# Patient Record
Sex: FEMALE | Race: WHITE | NOT HISPANIC OR LATINO | Employment: FULL TIME | URBAN - METROPOLITAN AREA
[De-identification: names, ages, dates, MRNs, and addresses within clinical notes are randomized per-mention and may not be internally consistent; named-entity substitution may affect disease eponyms.]

---

## 2022-06-19 PROBLEM — E87.70 FLUID OVERLOAD: Status: ACTIVE | Noted: 2022-06-19

## 2022-06-19 PROBLEM — I21.4 NSTEMI (NON-ST ELEVATED MYOCARDIAL INFARCTION): Status: ACTIVE | Noted: 2022-06-19

## 2022-06-19 PROBLEM — R06.03 ACUTE RESPIRATORY DISTRESS: Status: ACTIVE | Noted: 2022-06-19

## 2022-06-23 PROBLEM — E66.01 MORBID OBESITY WITH BMI OF 45.0-49.9, ADULT: Status: ACTIVE | Noted: 2022-06-23

## 2023-03-05 PROBLEM — C50.212 MALIGNANT NEOPLASM OF UPPER-INNER QUADRANT OF LEFT BREAST IN FEMALE, ESTROGEN RECEPTOR NEGATIVE: Status: ACTIVE | Noted: 2023-03-05

## 2023-03-05 PROBLEM — Z17.1 MALIGNANT NEOPLASM OF UPPER-INNER QUADRANT OF LEFT BREAST IN FEMALE, ESTROGEN RECEPTOR NEGATIVE: Status: ACTIVE | Noted: 2023-03-05

## 2023-03-05 PROBLEM — Z80.3 FAMILY HISTORY OF MALIGNANT NEOPLASM OF BREAST: Status: ACTIVE | Noted: 2023-03-05

## 2023-03-05 NOTE — PROGRESS NOTES
[facility]  MD Katie Carrero, NP-C    Chief Complaint:   Jocelyne Chamberlain is a 62 y.o. female presenting today for her 6 month follow up due to her breast cancer diagnosis.  She is due for her Mammogram.  She reports no interval changes on her self-breast examination.     History of Present Illness:   Mrs. Chamberlain was diagnosed with left breast cancer in May 2019 at the age of 58. She completed iban-adjuvant chemotherapy. SLN biopsy prior to chemotherapy showed 5 negative SLNs. She elected lumpectomy that revealed a residual 1.6 cm Grade II IDC with negative margins. [id number]. ER/[place] Negative and HER 2 = +3. [employer] Breast Cancer Guidelines-Based Panel Negative (2019). She completed adjuvant radiation therapy with Dr. Dixon. She also completed targeted therapy with Herceptin. MDs:::Bob Parish MD; Yaya Morel MD.    Past Medical History:   Diagnosis Date    Depression     Hypertension     Malignant neoplasm of upper-inner quadrant of left breast in female, estrogen receptor negative 3/5/2023      Past Surgical History:   Procedure Laterality Date    HYSTERECTOMY      LEFT HEART CATHETERIZATION Left 6/20/2022    Procedure: Left heart cath;  Surgeon: Suman Issa MD;  Location: [facility];  Service: Cardiovascular;  Laterality: Left;    rt knee surgery          Current Outpatient Medications:     ADVAIR DISKUS 250-50 mcg/dose diskus inhaler, Inhale 1 puff into the lungs 2 (two) times daily., Disp: , Rfl:     albuterol (PROVENTIL/VENTOLIN HFA) 90 mcg/actuation inhaler, Inhale 2 puffs into the lungs every 6 (six) hours as needed for Wheezing., Disp: , Rfl:     carvedilol (COREG) 25 MG tablet, TAKE ONE TABLET BY MOUTH TWICE DAILY (Patient taking differently: Take 25 mg by mouth 2 (two) times daily.), Disp: 60 tablet, Rfl: 4    ergocalciferol (ERGOCALCIFEROL) 50,000 unit Cap, TAKE ONE CAPSULE BY MOUTH ONCE A WEEK (Patient taking differently:  Take 50,000 Units by mouth every Saturday.), Disp: 4 capsule, Rfl: 4    isosorbide dinitrate (ISORDIL) 20 MG tablet, Take 1 tablet (20 mg total) by mouth 3 (three) times daily., Disp: 90 tablet, Rfl: 0    metOLazone (ZAROXOLYN) 2.5 MG tablet, Take by mouth., Disp: , Rfl:     rOPINIRole (REQUIP) 3 MG tablet, Take 3 mg by mouth nightly., Disp: , Rfl:     sertraline (ZOLOFT) 100 MG tablet, Take 100 mg by mouth every evening. TAKE WITH 50MG =150MG NIGHTLY, Disp: , Rfl:     sertraline (ZOLOFT) 50 MG tablet, Take 50 mg by mouth every evening. TAKE WITH 100MG= 150MG NIGHTLY, Disp: , Rfl:     tolterodine (DETROL LA) 4 MG 24 hr capsule, Take 4 mg by mouth once daily. ** NEW THERAPY NOT STARTED YET**, Disp: , Rfl:     torsemide (DEMADEX) 20 MG Tab, Take 1 tablet (20 mg total) by mouth once daily. (Patient not taking: Reported on 3/15/2023), Disp: 30 tablet, Rfl: 1   Review of patient's allergies indicates:   Allergen Reactions    Lactose Diarrhea      Social History     Tobacco Use    Smoking status: Never    Smokeless tobacco: Never   Substance Use Topics    Alcohol use: No      Family History   Problem Relation Age of Onset    Diabetes Father     Hypertension Father         Review of Systems   Genitourinary:  Negative for hot flashes.   Integumentary:  Negative for color change, rash, mole/lesion, breast mass, breast discharge and breast tenderness.   Breast: Negative for mass and tenderness   Physical Exam   Constitutional: She is cooperative.   HENT:   Head: Normocephalic.   Pulmonary/Chest: She exhibits no mass. Right breast exhibits no inverted nipple, no mass, no nipple discharge, no skin change and no tenderness. Left breast exhibits no inverted nipple, no mass, no nipple discharge, no skin change and no tenderness.   Abdominal: Normal appearance.   Musculoskeletal: Lymphadenopathy:      Upper Body:      Right upper body: No supraclavicular or axillary adenopathy.      Left upper body: No supraclavicular or axillary  adenopathy.     Neurological: She is alert.   Skin: No rash noted.        MAMMOGRAM REPORT: She has some diffuse fibronodular tissue bilaterally; there are no spiculated lesions, distortions or suspicious calcifications noted; NEM      Assessment/Plan  1. Malignant neoplasm of upper-inner quadrant of left breast in female, estrogen receptor negative  Assessment & Plan:   Patient is doing well and is being followed according to NCCN Guidelines. She understands that her imaging and exams have remained stable and is comfortable being followed in a conservative fashion. She understands the importance of monthly self-breast examination and knows to report any and all changes as they occur.        2. Family history of malignant neoplasm of breast  Assessment & Plan:  We discussed her family history and how it could impact her own future risks.  We discussed family vs. genetic history and the importance of each.  Genetic Counseling/Testing was offered, and all questions answered.             Medical Decision Making:  It is my impression that the patient suffers from all the listed conditions. Each of these conditions did affect my Plan of Care and all medical decisions that were rendered. The medical decision making was of high difficulty based on her comorbidities and her previous diagnosis of ER Negative/HER 2 Positive breast cancer, which can lead to an increased recurrence rate and pose a direct threat to her life. Laboratory evaluations are currently pending but will be reviewed in the next 24 hours. Any further recommendations will be communicated to the patient by me. I have reviewed and verified her allergies, list of medications, medical and surgical histories, social history, and a pertinent review of symptoms.         Follow up:  6 months and prn    For:  PE and CXR

## 2023-03-05 NOTE — ASSESSMENT & PLAN NOTE
We discussed her family history and how it could impact her own future risks.  We discussed family vs. genetic history and the importance of each.  Genetic Counseling/Testing was offered, and all questions answered.

## 2023-03-05 NOTE — ASSESSMENT & PLAN NOTE
Patient is doing well and is being followed according to NCCN Guidelines. She understands that her imaging and exams have remained stable and is comfortable being followed in a conservative fashion. She understands the importance of monthly self-breast examination and knows to report any and all changes as they occur.

## 2023-03-15 ENCOUNTER — OFFICE VISIT (OUTPATIENT)
Dept: SURGERY | Facility: CLINIC | Age: 63
End: 2023-03-15
Payer: COMMERCIAL

## 2023-03-15 VITALS — BODY MASS INDEX: 48.37 KG/M2 | HEIGHT: 63 IN | WEIGHT: 273 LBS

## 2023-03-15 DIAGNOSIS — C50.212 MALIGNANT NEOPLASM OF UPPER-INNER QUADRANT OF LEFT BREAST IN FEMALE, ESTROGEN RECEPTOR NEGATIVE: Primary | ICD-10-CM

## 2023-03-15 DIAGNOSIS — Z17.1 MALIGNANT NEOPLASM OF UPPER-INNER QUADRANT OF LEFT BREAST IN FEMALE, ESTROGEN RECEPTOR NEGATIVE: Primary | ICD-10-CM

## 2023-03-15 DIAGNOSIS — Z80.3 FAMILY HISTORY OF MALIGNANT NEOPLASM OF BREAST: ICD-10-CM

## 2023-03-15 PROCEDURE — 99214 PR OFFICE/OUTPT VISIT, EST, LEVL IV, 30-39 MIN: ICD-10-PCS | Mod: S$GLB,,, | Performed by: SPECIALIST

## 2023-03-15 PROCEDURE — 99214 OFFICE O/P EST MOD 30 MIN: CPT | Mod: S$GLB,,, | Performed by: SPECIALIST

## 2023-03-15 RX ORDER — METOLAZONE 2.5 MG/1
TABLET ORAL
COMMUNITY
Start: 2023-03-10

## 2023-09-25 DIAGNOSIS — C50.212 MALIGNANT NEOPLASM OF UPPER-INNER QUADRANT OF LEFT BREAST IN FEMALE, ESTROGEN RECEPTOR NEGATIVE: Primary | ICD-10-CM

## 2023-09-25 DIAGNOSIS — Z17.1 MALIGNANT NEOPLASM OF UPPER-INNER QUADRANT OF LEFT BREAST IN FEMALE, ESTROGEN RECEPTOR NEGATIVE: Primary | ICD-10-CM

## 2023-09-25 NOTE — PROGRESS NOTES
[facility]  Checo Mcgowan MD, FACS  Katie Cancino NP-C      Date of Service: 10/9/2023    Chief Complaint:   Jocelyne Chamberlain is a 63 y.o. female presenting today for her 6 month follow up due to her breast cancer diagnosis.  She is due for her  CXR.  She reports no interval changes on her self-breast examination.     History of Present Illness:   Mrs. Chamberlain was diagnosed with left breast cancer in May 2019 at the age of 58. She completed iban-adjuvant chemotherapy. SLN biopsy prior to chemotherapy showed 5 negative SLNs. She elected lumpectomy that revealed a residual 1.6 cm Grade II IDC with negative margins. [id number]. ER/[place] Negative and HER 2 = +3. [employer] Breast Cancer Guidelines-Based Panel Negative (2019). She completed adjuvant radiation therapy with Dr. Dixon. She also completed targeted therapy with Herceptin. MDs:::Bob Parish MD; Yaya Morel MD.    Past Medical History:   Diagnosis Date    Depression     Hypertension     Malignant neoplasm of upper-inner quadrant of left breast in female, estrogen receptor negative 3/5/2023      Past Surgical History:   Procedure Laterality Date    HYSTERECTOMY      LEFT HEART CATHETERIZATION Left 6/20/2022    Procedure: Left heart cath;  Surgeon: Suman Issa MD;  Location: [facility];  Service: Cardiovascular;  Laterality: Left;    rt knee surgery          Current Outpatient Medications:     ADVAIR DISKUS 250-50 mcg/dose diskus inhaler, Inhale 1 puff into the lungs 2 (two) times daily., Disp: , Rfl:     albuterol (PROVENTIL/VENTOLIN HFA) 90 mcg/actuation inhaler, Inhale 2 puffs into the lungs every 6 (six) hours as needed for Wheezing., Disp: , Rfl:     carvedilol (COREG) 25 MG tablet, TAKE ONE TABLET BY MOUTH TWICE DAILY (Patient taking differently: Take 25 mg by mouth 2 (two) times daily.), Disp: 60 tablet, Rfl: 4    ergocalciferol (ERGOCALCIFEROL) 50,000 unit Cap, TAKE ONE CAPSULE BY MOUTH ONCE A WEEK  (Patient taking differently: Take 50,000 Units by mouth every Saturday.), Disp: 4 capsule, Rfl: 4    metOLazone (ZAROXOLYN) 2.5 MG tablet, Take by mouth., Disp: , Rfl:     rOPINIRole (REQUIP) 3 MG tablet, Take 3 mg by mouth nightly., Disp: , Rfl:     sertraline (ZOLOFT) 100 MG tablet, Take 100 mg by mouth every evening. TAKE WITH 50MG =150MG NIGHTLY, Disp: , Rfl:     sertraline (ZOLOFT) 50 MG tablet, Take 50 mg by mouth every evening. TAKE WITH 100MG= 150MG NIGHTLY, Disp: , Rfl:     tolterodine (DETROL LA) 4 MG 24 hr capsule, Take 4 mg by mouth once daily. ** NEW THERAPY NOT STARTED YET**, Disp: , Rfl:     isosorbide dinitrate (ISORDIL) 20 MG tablet, Take 1 tablet (20 mg total) by mouth 3 (three) times daily., Disp: 90 tablet, Rfl: 0    torsemide (DEMADEX) 20 MG Tab, Take 1 tablet (20 mg total) by mouth once daily., Disp: 30 tablet, Rfl: 1   Review of patient's allergies indicates:   Allergen Reactions    Lactose Diarrhea      Social History     Tobacco Use    Smoking status: Never    Smokeless tobacco: Never   Substance Use Topics    Alcohol use: No      Family History   Problem Relation Age of Onset    Diabetes Father     Hypertension Father         Review of Systems   Integumentary:  Negative for color change, rash, mole/lesion, breast mass, breast discharge and breast tenderness.   Breast: Negative for mass and tenderness       Physical Exam   Constitutional: She is cooperative.   HENT:   Head: Normocephalic.   Pulmonary/Chest: She exhibits no mass. Right breast exhibits no inverted nipple, no mass, no nipple discharge, no skin change and no tenderness. Left breast exhibits no inverted nipple, no mass, no nipple discharge, no skin change and no tenderness.   Abdominal: Normal appearance.   Musculoskeletal: Lymphadenopathy:      Upper Body:      Right upper body: No supraclavicular or axillary adenopathy.      Left upper body: No supraclavicular or axillary adenopathy.     Neurological: She is alert.   Skin: No  rash noted.        CXR REPORT: Her lungs are well aerated and without worrisome masses; no pleural effusion noted; NEM       ASSESSMENT and PLAN OF CARE     1. Malignant neoplasm of upper-inner quadrant of left breast in female, estrogen receptor negative  Assessment & Plan:  She is doing well and will continue to be followed according to NCCN Guidelines. She understands that her imaging and exams have remained stable and is comfortable being followed in a conservative fashion. She understands the importance of monthly self-breast examination and knows to report any and all changes as they occur.    NOTE:::We viewed her films together at today's visit.  We discussed the multiple views obtained and the important findings.  Even benign changes were mentioned and her questions were answered.  She knows that she may receive a formal letter or report from the Radiologist.  She is to contact us if she has questions.        2. Family history of malignant neoplasm of breast  Assessment & Plan:  We discussed her family history and how it could impact her own future risks.  We discussed family vs. genetic history and the importance and implications of each.  Genetic Counseling/Testing was offered, and all questions answered to her satisfaction.  She knows that as additional family members are diagnosed - she will need to let us know as this may change follow up and imaging recommendations.    We had a discussion concerning Breast Cancer Risk Reduction and current NCCN Guidelines. She knows that her risk can be lowered slightly with a healthy lifestyle and minimal ETOH use. Being physically active will also help. She should reduce or stay away from OCPs and HRT as possible.         Medical Decision Making: ER Neg/Her Pos - It is my impression that the patient suffers from all the listed conditions. Each of these conditions did affect my Plan of Care and all medical decisions that were rendered. The medical decision making was  of high difficulty based on her comorbidities and her previous diagnosis of ER Negative/HER 2 Positive breast cancer, which can lead to an increased recurrence rate and pose a direct threat to her life. Laboratory evaluations are currently pending but will be reviewed in the next 24 hours. Any further recommendations will be communicated to the patient by me. I have reviewed and verified her allergies, list of medications, medical and surgical histories, social history, and a pertinent review of symptoms.    Follow up:  6 months and prn    For:  PE and MGM (JENN) at

## 2023-09-25 NOTE — ASSESSMENT & PLAN NOTE
She is doing well and will continue to be followed according to NCCN Guidelines. She understands that her imaging and exams have remained stable and is comfortable being followed in a conservative fashion. She understands the importance of monthly self-breast examination and knows to report any and all changes as they occur.    NOTE:::We viewed her films together at today's visit.  We discussed the multiple views obtained and the important findings.  Even benign changes were mentioned and her questions were answered.  She knows that she may receive a formal letter or report from the Radiologist.  She is to contact us if she has questions.

## 2023-10-09 ENCOUNTER — OFFICE VISIT (OUTPATIENT)
Dept: SURGERY | Facility: CLINIC | Age: 63
End: 2023-10-09
Payer: COMMERCIAL

## 2023-10-09 DIAGNOSIS — Z17.1 MALIGNANT NEOPLASM OF UPPER-INNER QUADRANT OF LEFT BREAST IN FEMALE, ESTROGEN RECEPTOR NEGATIVE: Primary | ICD-10-CM

## 2023-10-09 DIAGNOSIS — C50.212 MALIGNANT NEOPLASM OF UPPER-INNER QUADRANT OF LEFT BREAST IN FEMALE, ESTROGEN RECEPTOR NEGATIVE: Primary | ICD-10-CM

## 2023-10-09 DIAGNOSIS — Z80.3 FAMILY HISTORY OF MALIGNANT NEOPLASM OF BREAST: ICD-10-CM

## 2023-10-09 PROCEDURE — 99214 PR OFFICE/OUTPT VISIT, EST, LEVL IV, 30-39 MIN: ICD-10-PCS | Mod: S$GLB,,, | Performed by: SPECIALIST

## 2023-10-09 PROCEDURE — 99999 PR PBB SHADOW E&M-EST. PATIENT-LVL III: CPT | Mod: PBBFAC,,, | Performed by: SPECIALIST

## 2023-10-09 PROCEDURE — 99999 PR PBB SHADOW E&M-EST. PATIENT-LVL III: ICD-10-PCS | Mod: PBBFAC,,, | Performed by: SPECIALIST

## 2023-10-09 PROCEDURE — 99214 OFFICE O/P EST MOD 30 MIN: CPT | Mod: S$GLB,,, | Performed by: SPECIALIST

## 2024-03-29 DIAGNOSIS — Z17.1 MALIGNANT NEOPLASM OF UPPER-INNER QUADRANT OF LEFT BREAST IN FEMALE, ESTROGEN RECEPTOR NEGATIVE: Primary | ICD-10-CM

## 2024-03-29 DIAGNOSIS — C50.212 MALIGNANT NEOPLASM OF UPPER-INNER QUADRANT OF LEFT BREAST IN FEMALE, ESTROGEN RECEPTOR NEGATIVE: Primary | ICD-10-CM

## 2024-03-29 NOTE — PROGRESS NOTES
Date of Service: 4/17/2024    Chief Complaint:   Jocelyne Chamberlain is a 63 y.o. female presenting today for her 6 month follow up due to her breast cancer diagnosis.  She is due for her Mammogram.  She reports no interval changes on her self-breast examination.     History of Present Illness:   Mrs. Chamberlain was diagnosed with left breast cancer in May 2019 at the age of 58. She completed iban-adjuvant chemotherapy. SLN biopsy prior to chemotherapy showed 5 negative SLNs. She elected lumpectomy that revealed a residual 1.6 cm Grade II IDC with negative margins. [id number]. ER/[place] Negative and HER 2 = +3. [employer] Breast Cancer Guidelines-Based Panel Negative (2019). She completed adjuvant radiation therapy with Dr. Dixon. She also completed targeted therapy with Herceptin. MDs:::Bob Parish MD; Yaya Morel MD.    Past Medical History:   Diagnosis Date    Depression     Hypertension     Malignant neoplasm of upper-inner quadrant of left breast in female, estrogen receptor negative 3/5/2023      Past Surgical History:   Procedure Laterality Date    HYSTERECTOMY      LEFT HEART CATHETERIZATION Left 6/20/2022    Procedure: Left heart cath;  Surgeon: Suman Issa MD;  Location: [facility];  Service: Cardiovascular;  Laterality: Left;    rt knee surgery          Current Outpatient Medications:     ADVAIR DISKUS 250-50 mcg/dose diskus inhaler, Inhale 1 puff into the lungs 2 (two) times daily., Disp: , Rfl:     albuterol (PROVENTIL/VENTOLIN HFA) 90 mcg/actuation inhaler, Inhale 2 puffs into the lungs every 6 (six) hours as needed for Wheezing., Disp: , Rfl:     carvedilol (COREG) 25 MG tablet, TAKE ONE TABLET BY MOUTH TWICE DAILY (Patient taking differently: Take 25 mg by mouth 2 (two) times daily.), Disp: 60 tablet, Rfl: 4    ergocalciferol (ERGOCALCIFEROL) 50,000 unit Cap, TAKE ONE CAPSULE BY MOUTH ONCE A WEEK (Patient taking differently: Take 50,000 Units by mouth every Saturday.), Disp: 4  capsule, Rfl: 4    metOLazone (ZAROXOLYN) 2.5 MG tablet, Take by mouth., Disp: , Rfl:     rOPINIRole (REQUIP) 3 MG tablet, Take 3 mg by mouth nightly., Disp: , Rfl:     sertraline (ZOLOFT) 100 MG tablet, Take 100 mg by mouth every evening. TAKE WITH 50MG =150MG NIGHTLY, Disp: , Rfl:     sertraline (ZOLOFT) 50 MG tablet, Take 50 mg by mouth every evening. TAKE WITH 100MG= 150MG NIGHTLY, Disp: , Rfl:     tolterodine (DETROL LA) 4 MG 24 hr capsule, Take 4 mg by mouth once daily. ** NEW THERAPY NOT STARTED YET**, Disp: , Rfl:     isosorbide dinitrate (ISORDIL) 20 MG tablet, Take 1 tablet (20 mg total) by mouth 3 (three) times daily., Disp: 90 tablet, Rfl: 0    torsemide (DEMADEX) 20 MG Tab, Take 1 tablet (20 mg total) by mouth once daily., Disp: 30 tablet, Rfl: 1   Review of patient's allergies indicates:   Allergen Reactions    Lactose Diarrhea      Social History     Tobacco Use    Smoking status: Never    Smokeless tobacco: Never   Substance Use Topics    Alcohol use: No      Family History   Problem Relation Name Age of Onset    Diabetes Father      Hypertension Father          Review of Systems   Integumentary:  Negative for color change, rash, mole/lesion, breast mass, breast discharge and breast tenderness.   Breast: Negative for mass and tenderness       Physical Exam   Constitutional: She is cooperative.   HENT:   Head: Normocephalic.   Pulmonary/Chest: She exhibits no mass. Right breast exhibits no inverted nipple, no mass, no nipple discharge, no skin change and no tenderness. Left breast exhibits no inverted nipple, no mass, no nipple discharge, no skin change and no tenderness.   Abdominal: Normal appearance.   Musculoskeletal: Lymphadenopathy:      Upper Body:      Right upper body: No supraclavicular or axillary adenopathy.      Left upper body: No supraclavicular or axillary adenopathy.     Neurological: She is alert.   Skin: No rash noted.        MAMMOGRAM REPORT: She has some diffuse fibronodular  tissue; there are no spiculated lesions, distortions or suspicious calcifications noted; NEM    ASSESSMENT and PLAN OF CARE     1. Malignant neoplasm of upper-inner quadrant of left breast in female, estrogen receptor negative  Assessment & Plan:  She is doing well and will continue to be followed according to NCCN Guidelines. She understands that her imaging and exams have remained stable and is comfortable being followed in a conservative fashion. She understands the importance of monthly self-breast examination and knows to report any and all changes as they occur.    NOTE:::We viewed her films together at today's visit.  We discussed the multiple views obtained and the important findings.  Even benign changes were mentioned and her questions were answered.  She knows that she may receive a formal letter or report from the Radiologist.  She is to contact us if she has questions.        Medical Decision Making: ER Neg/Her Pos - It is my impression that the patient suffers from all the listed conditions. Each of these conditions did affect my Plan of Care and all medical decisions that were rendered. The medical decision making was of high difficulty based on her comorbidities and her previous diagnosis of ER Negative/HER 2 Positive breast cancer, which can lead to an increased recurrence rate and pose a direct threat to her life. Laboratory evaluations are currently pending but will be reviewed in the next 24 hours. Any further recommendations will be communicated to the patient by me. I have reviewed and verified her allergies, list of medications, medical and surgical histories, social history, and a pertinent review of symptoms.    Follow up: 6 months and prn    For:  Physical Examination and CXR

## 2024-04-17 ENCOUNTER — OFFICE VISIT (OUTPATIENT)
Dept: SURGERY | Facility: CLINIC | Age: 64
End: 2024-04-17
Payer: COMMERCIAL

## 2024-04-17 DIAGNOSIS — C50.212 MALIGNANT NEOPLASM OF UPPER-INNER QUADRANT OF LEFT BREAST IN FEMALE, ESTROGEN RECEPTOR NEGATIVE: Primary | ICD-10-CM

## 2024-04-17 DIAGNOSIS — Z17.1 MALIGNANT NEOPLASM OF UPPER-INNER QUADRANT OF LEFT BREAST IN FEMALE, ESTROGEN RECEPTOR NEGATIVE: Primary | ICD-10-CM

## 2024-04-17 PROCEDURE — 99999 PR PBB SHADOW E&M-EST. PATIENT-LVL III: CPT | Mod: PBBFAC,,, | Performed by: SPECIALIST

## 2024-04-17 PROCEDURE — 99214 OFFICE O/P EST MOD 30 MIN: CPT | Mod: S$GLB,,, | Performed by: SPECIALIST

## 2024-04-22 ENCOUNTER — TELEPHONE (OUTPATIENT)
Dept: SURGERY | Facility: CLINIC | Age: 64
End: 2024-04-22
Payer: COMMERCIAL

## 2024-04-22 DIAGNOSIS — C50.212 MALIGNANT NEOPLASM OF UPPER-INNER QUADRANT OF LEFT BREAST IN FEMALE, ESTROGEN RECEPTOR NEGATIVE: Primary | ICD-10-CM

## 2024-04-22 DIAGNOSIS — Z17.1 MALIGNANT NEOPLASM OF UPPER-INNER QUADRANT OF LEFT BREAST IN FEMALE, ESTROGEN RECEPTOR NEGATIVE: Primary | ICD-10-CM

## 2024-04-22 NOTE — TELEPHONE ENCOUNTER
----- Message from Nadiya Strange MA sent at 4/22/2024 10:30 AM CDT -----  Contact: Jocelyne  The lab at facility she uses states they need the Chem 12 reordered as the CMP and it faxed to [phone]. Please call the patient at [phone]

## 2024-09-30 DIAGNOSIS — C50.212 MALIGNANT NEOPLASM OF UPPER-INNER QUADRANT OF LEFT BREAST IN FEMALE, ESTROGEN RECEPTOR NEGATIVE: Primary | ICD-10-CM

## 2024-09-30 DIAGNOSIS — Z17.1 MALIGNANT NEOPLASM OF UPPER-INNER QUADRANT OF LEFT BREAST IN FEMALE, ESTROGEN RECEPTOR NEGATIVE: Primary | ICD-10-CM
